# Patient Record
Sex: MALE | Race: BLACK OR AFRICAN AMERICAN | Employment: FULL TIME | URBAN - METROPOLITAN AREA
[De-identification: names, ages, dates, MRNs, and addresses within clinical notes are randomized per-mention and may not be internally consistent; named-entity substitution may affect disease eponyms.]

---

## 2017-03-24 ENCOUNTER — OFFICE VISIT (OUTPATIENT)
Dept: FAMILY MEDICINE | Facility: CLINIC | Age: 37
End: 2017-03-24
Payer: COMMERCIAL

## 2017-03-24 VITALS
HEART RATE: 61 BPM | TEMPERATURE: 97.6 F | WEIGHT: 176.4 LBS | OXYGEN SATURATION: 98 % | SYSTOLIC BLOOD PRESSURE: 124 MMHG | DIASTOLIC BLOOD PRESSURE: 76 MMHG | HEIGHT: 76 IN | BODY MASS INDEX: 21.48 KG/M2

## 2017-03-24 DIAGNOSIS — R07.0 THROAT PAIN: Primary | ICD-10-CM

## 2017-03-24 DIAGNOSIS — J02.0 ACUTE STREPTOCOCCAL PHARYNGITIS: ICD-10-CM

## 2017-03-24 LAB
DEPRECATED S PYO AG THROAT QL EIA: ABNORMAL
MICRO REPORT STATUS: ABNORMAL
SPECIMEN SOURCE: ABNORMAL

## 2017-03-24 PROCEDURE — 99213 OFFICE O/P EST LOW 20 MIN: CPT | Performed by: FAMILY MEDICINE

## 2017-03-24 PROCEDURE — 87880 STREP A ASSAY W/OPTIC: CPT | Performed by: FAMILY MEDICINE

## 2017-03-24 PROCEDURE — 96372 THER/PROPH/DIAG INJ SC/IM: CPT | Performed by: FAMILY MEDICINE

## 2017-03-24 NOTE — Clinical Note
Please review. Does the clinic need to charge anything else regarding the injection received in the office?

## 2017-03-24 NOTE — PATIENT INSTRUCTIONS
Pharyngitis: Strep (Confirmed)     You have had a positive test for strep throat. Strep throat is a contagious illness. It is spread by coughing, kissing or by touching others after touching your mouth or nose. Symptoms include throat pain which is worse with swallowing, aching all over, headache and fever. It is treated with antibiotic medication. This should help you start to feel better within 1-2 days.  Home care    Rest at home. Drink plenty of fluids to avoid dehydration.    No work or school for the first 2 days of taking the antibiotics. After this time, you will not be contagious. You can then return to school or work if you are feeling better.     The antibiotic medication must be taken for the full 10 days, even if you feel better. This is very important to ensure the infection is treated. It is also important to prevent drug-resistent organisms from developing. If you were given an antibiotic shot, no more antibiotics are needed.    You may use acetaminophen (Tylenol) or ibuprofen (Motrin, Advil) to control pain or fever, unless another medicine was prescribed for this. (NOTE: If you have chronic liver or kidney disease or ever had a stomach ulcer or GI bleeding, talk with your doctor before using these medicines.)    Throat lozenges or sprays (such as Chloraseptic) help reduce pain. Gargling with warm salt water will also reduce throat pain. Dissolve 1/2 teaspoon of salt in 1 glass of warm water. This may be useful just before meals.     Soft foods are okay. Avoid salty or spicy foods.  Follow-up care  Follow up with your healthcare provider or our staff if you are not improving over the next week.  When to seek medical advice  Call your healthcare provider right away if any of these occur:    Fever as directed by your doctor     New or worsening ear pain, sinus pain, or headache    Painful lumps in the back of neck    Stiff neck    Lymph nodes are getting larger or becoming soft in the  middle    Inability to swallow liquids, excessive drooling, or inability to open mouth wide due to throat pain    Signs of dehydration (very dark urine or no urine, sunken eyes, dizziness)    Trouble breathing or noisy breathing    Muffled voice    New rash    3345-8604 [employer]. [address] [place] [address]. All rights reserved. This information is not intended as a substitute for professional medical care. Always follow your healthcare professional's instructions.

## 2017-03-24 NOTE — MR AVS SNAPSHOT
After Visit Summary   3/24/2017    Obed Higginbotham    MRN: [id number]           Patient Information     Date Of Birth          1980        Visit Information        Provider Department      3/24/2017 10:00 AM Roxane Johnson MD [facility]        Today's Diagnoses     Throat pain    -  1    Acute streptococcal pharyngitis          Care Instructions      Pharyngitis: Strep (Confirmed)     You have had a positive test for strep throat. Strep throat is a contagious illness. It is spread by coughing, kissing or by touching others after touching your mouth or nose. Symptoms include throat pain which is worse with swallowing, aching all over, headache and fever. It is treated with antibiotic medication. This should help you start to feel better within 1-2 days.  Home care    Rest at home. Drink plenty of fluids to avoid dehydration.    No work or school for the first 2 days of taking the antibiotics. After this time, you will not be contagious. You can then return to school or work if you are feeling better.     The antibiotic medication must be taken for the full 10 days, even if you feel better. This is very important to ensure the infection is treated. It is also important to prevent drug-resistent organisms from developing. If you were given an antibiotic shot, no more antibiotics are needed.    You may use acetaminophen (Tylenol) or ibuprofen (Motrin, Advil) to control pain or fever, unless another medicine was prescribed for this. (NOTE: If you have chronic liver or kidney disease or ever had a stomach ulcer or GI bleeding, talk with your doctor before using these medicines.)    Throat lozenges or sprays (such as Chloraseptic) help reduce pain. Gargling with warm salt water will also reduce throat pain. Dissolve 1/2 teaspoon of salt in 1 glass of warm water. This may be useful just before meals.     Soft foods are okay. Avoid salty or spicy foods.  Follow-up care  Follow up with  "your healthcare provider or our staff if you are not improving over the next week.  When to seek medical advice  Call your healthcare provider right away if any of these occur:    Fever as directed by your doctor     New or worsening ear pain, sinus pain, or headache    Painful lumps in the back of neck    Stiff neck    Lymph nodes are getting larger or becoming soft in the middle    Inability to swallow liquids, excessive drooling, or inability to open mouth wide due to throat pain    Signs of dehydration (very dark urine or no urine, sunken eyes, dizziness)    Trouble breathing or noisy breathing    Muffled voice    New rash    9465-6320 [employer]. [address] [place] [address]. All rights reserved. This information is not intended as a substitute for professional medical care. Always follow your healthcare professional's instructions.              Follow-ups after your visit        Follow-up notes from your care team     Return if symptoms worsen or fail to improve.      Who to contact     Normal or non-critical lab and imaging results will be communicated to you by [employer], letter or phone within 4 business days after the clinic has received the results. If you do not hear from us within 7 days, please contact the clinic through [employer] or phone. If you have a critical or abnormal lab result, we will notify you by phone as soon as possible.  Submit refill requests through "[employer] or call your pharmacy and they will forward the refill request to us. Please allow 3 business days for your refill to be completed.          If you need to speak with a  for additional information , please call: [phone]             Additional Information About Your Visit        "[employer] Information     "[employer] lets you send messages to your doctor, view your test results, renew your prescriptions, schedule appointments and more. To sign up, go to [email or web address] . Click on \"Log " "in\" on the left side of the screen, which will take you to the Welcome page. Then click on \"Sign up Now\" on the right side of the page.     You will be asked to enter the access code listed below, as well as some personal information. Please follow the directions to create your username and password.     Your access code is: [email or web address]  Expires: 2017 10:39 AM     Your access code will  in 90 days. If you need help or a new code, please call your [facility] clinic or [phone].        Care EveryWhere ID     This is your Care EveryWhere ID. This could be used by other organizations to access your [facility] medical records  [id number]        Your Vitals Were     Pulse Temperature Height Pulse Oximetry BMI (Body Mass Index)       61 97.6  F (36.4  C) (Tympanic) 6' 4\" (1.93 m) 98% 21.47 kg/m2        Blood Pressure from Last 3 Encounters:   17 124/76   16 124/64   16 122/71    Weight from Last 3 Encounters:   17 176 lb 6.4 oz (80 kg)   16 176 lb 9.6 oz (80.1 kg)   16 177 lb (80.3 kg)              We Performed the Following     Strep, Rapid Screen          Today's Medication Changes          These changes are accurate as of: 3/24/17 10:39 AM.  If you have any questions, ask your nurse or doctor.               Start taking these medicines.        Dose/Directions    penicillin G benzathine [id number] UNIT/ML injection   Commonly known as:  BICILLIN   Used for:  Acute streptococcal pharyngitis        Dose:  2 mL   Inject 2 mLs (1.2 Million Units) into the muscle once for 1 dose   Quantity:  2 mL   Refills:  0            Where to get your medicines      Some of these will need a paper prescription and others can be bought over the counter.  Ask your nurse if you have questions.     You don't need a prescription for these medications     penicillin G benzathine [id number] UNIT/ML injection                Primary Care Provider Office Phone # Fax #    Valente Lorenz PA-C [phone] " [phone]       [facility] [address] [facility] [address]        Thank you!     Thank you for choosing [facility]  for your care. Our goal is always to provide you with excellent care. Hearing back from our patients is one way we can continue to improve our services. Please take a few minutes to complete the written survey that you may receive in the mail after your visit with us. Thank you!             Your Updated Medication List - Protect others around you: Learn how to safely use, store and throw away your medicines at www.disposemymeds.org.          This list is accurate as of: 3/24/17 10:39 AM.  Always use your most recent med list.                   Brand Name Dispense Instructions for use    ADVIL PO      Take 400 mg by mouth every 6 hours as needed for moderate pain       penicillin G benzathine [id number] UNIT/ML injection    BICILLIN    2 mL    Inject 2 mLs (1.2 Million Units) into the muscle once for 1 dose

## 2017-03-24 NOTE — NURSING NOTE
"Chief Complaint   Patient presents with     Pharyngitis       Initial /76  Pulse 61  Temp 97.6  F (36.4  C) (Tympanic)  Ht 6' 4\" (1.93 m)  Wt 176 lb 6.4 oz (80 kg)  SpO2 98%  BMI 21.47 kg/m2 Estimated body mass index is 21.47 kg/(m^2) as calculated from the following:    Height as of this encounter: 6' 4\" (1.93 m).    Weight as of this encounter: 176 lb 6.4 oz (80 kg).  Medication Reconciliation: complete     The following medication was given:     MEDICATION: Bicillin LA 1.2/ 2 mL  ROUTE: IM  SITE: LUQ - Gluteus  DOSE: 2mL   LOT #: [id number]  :  [employer]  EXPIRATION DATE:  12/2017  NDC#: 33467-244-45  Per Dr. Elizabeth Mar MA      "

## 2017-03-24 NOTE — PROGRESS NOTES
"  SUBJECTIVE:  Obed Higginbotham is a 37 year old male who presents with the following concerns;              Symptoms: cc Present Absent Comment   Fever/Chills   x    Fatigue  x     Muscle Aches  x     Eye Irritation   x    Sneezing   x    Nasal Amaury/Drg  x     Sinus Pressure/Pain  x     Loss of smell   x    Dental pain   x    Sore Throat  x     Swollen Glands  x  Tender    Ear Pain/Fullness   x    Cough   x    Wheeze   x    Chest Pain   x    Shortness of breath   x    Rash   x    Other   x      Symptom duration:  x1 day   Sympom severity:  worsening   Treatments tried:  none    Contacts:  wife was dx with strep        Medications updated and reviewed.  Current Outpatient Prescriptions   Medication     penicillin G benzathine (BICILLIN) [id number] UNIT/ML injection     Ibuprofen (ADVIL PO)     No current facility-administered medications for this visit.        Past, family and surgical history is updated and reviewed in the record.    ROS:  Other than noted above, general, HEENT, respiratory, cardiac and gastrointestinal systems are negative.    OBJECTIVE:  /76  Pulse 61  Temp 97.6  F (36.4  C) (Tympanic)  Ht 6' 4\" (1.93 m)  Wt 176 lb 6.4 oz (80 kg)  SpO2 98%  BMI 21.47 kg/m2  GENERAL:  Alert, no acute distress  EYES:  PERRL, EOM normal, conjunctiva and lids normal  HEENT:  Ears and TMs normal, oral mucosa normal. Erythematous posterior oropharynx with enlarged tonsils. No exudates noted.   RESP:  Lungs clear to auscultation.  CV: normal rate, regular rhythm, no murmur or gallop.    DATA  Reviewed and discussed with patient prior to discharge.  Results for orders placed or performed in visit on 03/24/17   Strep, Rapid Screen   Result Value Ref Range    Specimen Description Throat     Rapid Strep A Screen (A)      POSITIVE: Group A Streptococcal antigen detected by immunoassay.    Micro Report Status FINAL 03/24/2017        Assessment/Plan:     bOed was seen today for pharyngitis.    Diagnoses and all " orders for this visit:    Throat pain  -     Strep, Rapid Screen  Throat lozenges or sprays (such as Chloraseptic) help reduce pain. Gargling with warm salt water will also reduce throat pain. Dissolve 1/2 teaspoon of salt in 1 glass of warm water. This may be useful just before meals.    Acute streptococcal pharyngitis  Patient requested for an injection instead, states that he forgets to take pills.   -     penicillin G benzathine (BICILLIN) [id number] UNIT/ML injection; Inject 2 mLs (1.2 Million Units) into the muscle once for 1 dose           Follow up if symptoms fail to improve or worsen.      The patient was in agreement with the plan today and had no questions or concerns prior to leaving the clinic.      Roxane Johnson M.D    [facility]

## 2018-03-27 ENCOUNTER — OFFICE VISIT (OUTPATIENT)
Dept: UROLOGY | Facility: CLINIC | Age: 38
End: 2018-03-27
Payer: COMMERCIAL

## 2018-03-27 VITALS — SYSTOLIC BLOOD PRESSURE: 126 MMHG | DIASTOLIC BLOOD PRESSURE: 79 MMHG | OXYGEN SATURATION: 100 % | HEART RATE: 65 BPM

## 2018-03-27 DIAGNOSIS — Z30.09 VASECTOMY EVALUATION: Primary | ICD-10-CM

## 2018-03-27 PROCEDURE — 99203 OFFICE O/P NEW LOW 30 MIN: CPT | Performed by: UROLOGY

## 2018-03-27 NOTE — PROGRESS NOTES
Vasectomy Consult    Reason for visit:   Discuss as a method of birth control/sterilization.  He is interested in vasectomy scrotally.  Patient has 2 children and desires sterilization.  Does not want to use condom or having partners on birth control pills.  He has no erections problem.  He has no urinary complaints.    No current outpatient prescriptions on file.     No Known Allergies  Past Medical History:   Diagnosis Date     NO ACTIVE PROBLEMS      Stress fracture of left tibia 12/5/2012     Past Surgical History:   Procedure Laterality Date     LAPAROSCOPIC APPENDECTOMY N/A 12/18/2015    Procedure: LAPAROSCOPIC APPENDECTOMY;  Surgeon: Rodriguez Gill MD;  Location: [facility] OR     NO HISTORY OF SURGERY        No family history on file.  Social History     Social History     Marital status:      Spouse name: N/A     Number of children: N/A     Years of education: N/A     Social History Main Topics     Smoking status: Current Some Day Smoker     Packs/day: 0.25     Smokeless tobacco: Never Used     Alcohol use No     Drug use: No     Sexual activity: Yes     Partners: Female     Other Topics Concern     Parent/Sibling W/ Cabg, Mi Or Angioplasty Before 65f 55m? No     Social History Narrative       REVIEW OF SYSTEMS  =================  C: NEGATIVE for fever, chills, change in weight  I: NEGATIVE for worrisome rashes, moles or lesions  E/M: NEGATIVE for ear, mouth and throat problems  R: NEGATIVE for significant cough or SHORTNESS OF BREATH  CV:  NEGATIVE for chest pain, palpitations or peripheral edema  GI: NEGATIVE for nausea, abdominal pain, heartburn, or change in bowel habits  NEURO: NEGATIVE numbness/weakness  : see HPI  PSYCH: NEGATIVE depression/anxiety  LYmph: no new enlarged lymph nodes  Ortho: no new trauma/movements      Physical Exam:  /79 (BP Location: Right arm, Patient Position: Chair, Cuff Size: Adult Regular)  Pulse 65  SpO2 100%   Patient is pleasant, in no acute distress, good  general condition.  HEENT:  Normalcephalic, atraumatic  Lung: no evidence of respiratory distress    Abdomen: Soft, nondistended, non tender. No masses. No rebound or guarding.   Exam: penis no d/c testis no masses bilateral vas palpated no scrotal lesion  Skin: Warm and dry.  No redness.  Neuro: grossly normal  Psych normal mood and affect  Musculoskeletal  moving all extremities        Discussed    That vasectomy is permanent method of birth control.    That vasectomy can fail due to recanalization of the vas even many months/years later.    That he needs 2 negative sperm checks to be considered sterile    That there are other methods that are not permanent and also that the sperm can be frozen for later use.    How the technique is performed, risks of infection, bleeding, damage to the testes vessels and testes atrophy    Long term complication such as chronic and difficult to treat testes pain and questionable increase incidence of prostate cancer    That the procedure can be done at the clinic or hospital OR        Plan:    Stop Aspirin  Will schedule Vasectomy in the future

## 2018-03-27 NOTE — PATIENT INSTRUCTIONS
Your Vasectomy is scheduled 4/11/2018 at 9:45am in the [address]. Please call [phone] if you need to reschedule this appointment or if you have any question.     Preparation for Vasectomy:  Shave the hair away from the base of your penis and around your testicles.  Wear snug underwear the day of the vasectomy to support your testicles.  Do not take aspirin, ibuprofen, advil, motrin, aleve products one week prior to your vasectomy.  Arrange for a  if you take any medication for relaxation from the physician.      General Vasectomy Information    Vasectomy is a surgery.  If it is successful, it will be impossible for you to ever father children.  The following information regards the male sterilization done by an operation called a vasectomy.  This is done in the physician's office.    The operation done to sterilize the male is easier, safer and much less expensive than the operation done to sterilize the woman.    Sterilization should be considered permanent.  For most males, once the operation is done, it can never me undone.  There are attempts made occasionally to reconnect the tubes that have been cut during the procedure, but this is very difficult and expensive and works only about 50-70% of the time.  In order for any of the physicians in our clinic to do a vasectomy, we require that you consider this a permanent form a sterilization.    A vasectomy can be done at any time, but it is best to think about having it done when you can take at least one day off from work and any excess activities.    Your decision to have a vasectomy should only be made with the following facts clear in mind.    1. First, a vasectomy is only one of several means of birth control.  Many form of temporary contraception are available.  If you have any questions about other methods, please discuss this with your physician.    2. A vasectomy may be unsuccessful in approximately one  out of 1000 couples per year.  This occurs when the tubes which are cut during the procedure reconnect themselves.  Sterility cannot be guaranteed.    3. You should be aware that it is the current belief of the medical profession that a vasectomy procedure does not alter a male physically, physiologically or sexually.  Because each person is a unique individual, there is always the possibility of an adverse phychiatric reaction.  This can be best avoided by being very comfortable in your own mind that you want to have this done, and that you do not want to father any children in the future.  If this is not clear in your mind, this should be further discussed with your physician.    4. You will not notice a change in the volume of your ejaculate since sperm is a very small amount of the semen and it is only the sperm that is stopped from entering the ejaculate after a vasectomy.  Your prostate and seminal vesicle glands really supply most of the semen and this is not at all decreased after a vasectomy.  Also there is no effect on the male hormones.    5. You do not become sterile immediately following a vasectomy due to the fact that there is still sperm remaining in your system that must be eliminated by ejaculation.  For this reason, your sexual partner could still become pregnant for a period of time following the vasectomy operation.  It is necessary that contraceptive measures be used until you receive confirmation from your physician that you are sterile.  It takes approximately 12 ejaculations to clear the semen of sperm, but this can differ in different men.  For this reason, it is very important that your semen be checked for sperm before you are considered sterile, and this should be done approximately 12 weeks after your vasectomy.      6. Vasectomy has risks and benefits.  Among the risks are possible complications resulting from the procedure.  These risks include but are not limited to:   A.  Bleeding,  infection, or hematoma occuring during or in the recovery period   from the procedure.   B.  Sperm granuloma or a small pea to walnut sized lump which is a collection of   scar tissue and sperm in your scrotal sack and remains permanently   C.  There may be an increased risk of prostate cancer although the data is   unclear.

## 2018-03-27 NOTE — MR AVS SNAPSHOT
After Visit Summary   3/27/2018    Obed Higginbotham    MRN: [id number]           Patient Information     Date Of Birth          1980        Visit Information        Provider Department      3/27/2018 8:30 AM Javier Barahona MD [facility] Instructions    Your Vasectomy is scheduled 4/11/2018 at 9:45am in the [address]. Please call [phone] if you need to reschedule this appointment or if you have any question.     Preparation for Vasectomy:  Shave the hair away from the base of your penis and around your testicles.  Wear snug underwear the day of the vasectomy to support your testicles.  Do not take aspirin, ibuprofen, advil, motrin, aleve products one week prior to your vasectomy.  Arrange for a  if you take any medication for relaxation from the physician.      General Vasectomy Information    Vasectomy is a surgery.  If it is successful, it will be impossible for you to ever father children.  The following information regards the male sterilization done by an operation called a vasectomy.  This is done in the physician's office.    The operation done to sterilize the male is easier, safer and much less expensive than the operation done to sterilize the woman.    Sterilization should be considered permanent.  For most males, once the operation is done, it can never me undone.  There are attempts made occasionally to reconnect the tubes that have been cut during the procedure, but this is very difficult and expensive and works only about 50-70% of the time.  In order for any of the physicians in our clinic to do a vasectomy, we require that you consider this a permanent form a sterilization.    A vasectomy can be done at any time, but it is best to think about having it done when you can take at least one day off from work and any excess activities.    Your decision to have a vasectomy should only be made with the  following facts clear in mind.    1. First, a vasectomy is only one of several means of birth control.  Many form of temporary contraception are available.  If you have any questions about other methods, please discuss this with your physician.    2. A vasectomy may be unsuccessful in approximately one out of 1000 couples per year.  This occurs when the tubes which are cut during the procedure reconnect themselves.  Sterility cannot be guaranteed.    3. You should be aware that it is the current belief of the medical profession that a vasectomy procedure does not alter a male physically, physiologically or sexually.  Because each person is a unique individual, there is always the possibility of an adverse phychiatric reaction.  This can be best avoided by being very comfortable in your own mind that you want to have this done, and that you do not want to father any children in the future.  If this is not clear in your mind, this should be further discussed with your physician.    4. You will not notice a change in the volume of your ejaculate since sperm is a very small amount of the semen and it is only the sperm that is stopped from entering the ejaculate after a vasectomy.  Your prostate and seminal vesicle glands really supply most of the semen and this is not at all decreased after a vasectomy.  Also there is no effect on the male hormones.    5. You do not become sterile immediately following a vasectomy due to the fact that there is still sperm remaining in your system that must be eliminated by ejaculation.  For this reason, your sexual partner could still become pregnant for a period of time following the vasectomy operation.  It is necessary that contraceptive measures be used until you receive confirmation from your physician that you are sterile.  It takes approximately 12 ejaculations to clear the semen of sperm, but this can differ in different men.  For this reason, it is very important that your  "semen be checked for sperm before you are considered sterile, and this should be done approximately 12 weeks after your vasectomy.      6. Vasectomy has risks and benefits.  Among the risks are possible complications resulting from the procedure.  These risks include but are not limited to:   A.  Bleeding, infection, or hematoma occuring during or in the recovery period   from the procedure.   B.  Sperm granuloma or a small pea to walnut sized lump which is a collection of   scar tissue and sperm in your scrotal sack and remains permanently   C.  There may be an increased risk of prostate cancer although the data is   unclear.            Follow-ups after your visit        Your next 10 appointments already scheduled     Apr 11, 2018 10:00 AM CDT   [facility] with J[facility]   [facility]    [address] [place] [id number] [phone]              Who to contact     If you have questions or need follow up information about today's clinic visit or your schedule please contact [facility] directly at [phone].  Normal or non-critical lab and imaging results will be communicated to you by MyChart, letter or phone within 4 business days after the clinic has received the results. If you do not hear from us within 7 days, please contact the clinic through [employer] or phone. If you have a critical or abnormal lab result, we will notify you by phone as soon as possible.  Submit refill requests through [employer] or call your pharmacy and they will forward the refill request to us. Please allow 3 business days for your refill to be completed.          Additional Information About Your Visit        [employer] Information     [employer] lets you send messages to your doctor, view your test results, renew your prescriptions, schedule appointments and more. To sign up, go to [email or web address] . Click on \"Log in\" on the left side of the screen, which " "will take you to the Welcome page. Then click on \"Sign up Now\" on the right side of the page.     You will be asked to enter the access code listed below, as well as some personal information. Please follow the directions to create your username and password.     Your access code is: [email or web address]  Expires: 2018  8:48 AM     Your access code will  in 90 days. If you need help or a new code, please call your [place] clinic or [phone].        Care EveryWhere ID     This is your Care EveryWhere ID. This could be used by other organizations to access your [place] medical records  [id number]        Your Vitals Were     Pulse Pulse Oximetry                65 100%           Blood Pressure from Last 3 Encounters:   18 126/79   17 124/76   16 124/64    Weight from Last 3 Encounters:   17 80 kg (176 lb 6.4 oz)   16 80.1 kg (176 lb 9.6 oz)   16 80.3 kg (177 lb)              Today, you had the following     No orders found for display         Today's Medication Changes          These changes are accurate as of 3/27/18  8:48 AM.  If you have any questions, ask your nurse or doctor.               Stop taking these medicines if you haven't already. Please contact your care team if you have questions.     ADVIL PO   Stopped by:  Javier Barahona MD                    Primary Care Provider Office Phone # Fax #    Valente Lorenz PA-C [phone]       [address] [place] [id number]        Equal Access to Services     [facility]: Hadii meredith enriquez hadasho Solyla, waaxda luqadaha, qaybta kaalmajoel pedro, carline dixon . So [facility] [phone].    ATENCIÓN: Si habla español, tiene a burks disposición servicios gratuitos de asistencia lingüística. Llame al [phone].    We comply with applicable federal civil rights laws and [place] laws. We do not discriminate on the basis of race, color, national origin, age, " disability, sex, sexual orientation, or gender identity.            Thank you!     Thank you for choosing [facility]  for your care. Our goal is always to provide you with excellent care. Hearing back from our patients is one way we can continue to improve our services. Please take a few minutes to complete the written survey that you may receive in the mail after your visit with us. Thank you!             Your Updated Medication List - Protect others around you: Learn how to safely use, store and throw away your medicines at www.disposemymeds.org.      Notice  As of 3/27/2018  8:48 AM    You have not been prescribed any medications.

## 2018-05-29 ENCOUNTER — OFFICE VISIT (OUTPATIENT)
Dept: UROLOGY | Facility: CLINIC | Age: 38
End: 2018-05-29
Payer: COMMERCIAL

## 2018-05-29 DIAGNOSIS — Z30.2 ENCOUNTER FOR STERILIZATION: Primary | ICD-10-CM

## 2018-05-29 PROCEDURE — 55250 REMOVAL OF SPERM DUCT(S): CPT | Performed by: UROLOGY

## 2018-05-29 PROCEDURE — 88302 TISSUE EXAM BY PATHOLOGIST: CPT | Performed by: UROLOGY

## 2018-05-29 NOTE — MR AVS SNAPSHOT
After Visit Summary   5/29/2018    Obed Higginbotham    MRN: [id number]           Patient Information     Date Of Birth          1980        Visit Information        Provider Department      5/29/2018 10:30 AM Javier Barahona MD; [facility]        Today's Diagnoses     Encounter for sterilization    -  1       Follow-ups after your visit        Future tests that were ordered for you today     Open Standing Orders        Priority Remaining Interval Expires Ordered    Semen Post Vasectomy Qual ([place], [place], [place]) Routine 2/2  5/29/2019 5/29/2018            Who to contact     If you have questions or need follow up information about today's clinic visit or your schedule please contact [facility] directly at [phone].  Normal or non-critical lab and imaging results will be communicated to you by MyChart, letter or phone within 4 business days after the clinic has received the results. If you do not hear from us within 7 days, please contact the clinic through MyChart or phone. If you have a critical or abnormal lab result, we will notify you by phone as soon as possible.  Submit refill requests through [employer] or call your pharmacy and they will forward the refill request to us. Please allow 3 business days for your refill to be completed.          Additional Information About Your Visit        Care EveryWhere ID     This is your Care EveryWhere ID. This could be used by other organizations to access your [facility] medical records  [id number]         Blood Pressure from Last 3 Encounters:   05/29/18 (P) 121/83   03/27/18 126/79   03/24/17 124/76    Weight from Last 3 Encounters:   03/24/17 80 kg (176 lb 6.4 oz)   11/12/16 80.1 kg (176 lb 9.6 oz)   08/01/16 80.3 kg (177 lb)              We Performed the Following     Surgical pathology exam     VASECTOMY UNILAT/BILAT W POSTOP SEMEN        Primary Care Provider Office Phone # Fax #    Valente Barragan  SUGEY Lorenz [phone]       [address] [facility] [address]        Equal Access to Services     ROSA RODRIGUEZ : Lynn Coyne, cristian chin, angelajuvenal kaaveryda hoodharleenjoel, carline kellyin hayaaalphonso andradevirgen quintana [facility]. So [facility] [phone].    ATENCIÓN: Si habla español, tiene a burks disposición servicios gratuitos de asistencia lingüística. Llame al [phone].    We comply with applicable federal civil rights laws and [place] laws. We do not discriminate on the basis of race, color, national origin, age, disability, sex, sexual orientation, or gender identity.            Thank you!     Thank you for choosing [facility]  for your care. Our goal is always to provide you with excellent care. Hearing back from our patients is one way we can continue to improve our services. Please take a few minutes to complete the written survey that you may receive in the mail after your visit with us. Thank you!             Your Updated Medication List - Protect others around you: Learn how to safely use, store and throw away your medicines at www.disposemymeds.org.      Notice  As of 5/29/2018 11:13 AM    You have not been prescribed any medications.

## 2018-05-30 LAB — COPATH REPORT: NORMAL

## 2019-04-26 ENCOUNTER — OFFICE VISIT (OUTPATIENT)
Dept: FAMILY MEDICINE | Facility: CLINIC | Age: 39
End: 2019-04-26
Payer: COMMERCIAL

## 2019-04-26 VITALS
DIASTOLIC BLOOD PRESSURE: 87 MMHG | BODY MASS INDEX: 22.53 KG/M2 | SYSTOLIC BLOOD PRESSURE: 137 MMHG | HEART RATE: 79 BPM | WEIGHT: 185 LBS | HEIGHT: 76 IN

## 2019-04-26 DIAGNOSIS — Z00.00 ROUTINE HISTORY AND PHYSICAL EXAMINATION OF ADULT: Primary | ICD-10-CM

## 2019-04-26 DIAGNOSIS — R19.7 DIARRHEA, UNSPECIFIED TYPE: ICD-10-CM

## 2019-04-26 DIAGNOSIS — A09 TRAVELER'S DIARRHEA: ICD-10-CM

## 2019-04-26 DIAGNOSIS — A05.3: ICD-10-CM

## 2019-04-26 LAB
BASOPHILS # BLD AUTO: 0 10E9/L (ref 0–0.2)
BASOPHILS NFR BLD AUTO: 0.2 %
CHOLEST SERPL-MCNC: 168 MG/DL
DIFFERENTIAL METHOD BLD: NORMAL
EOSINOPHIL # BLD AUTO: 0.1 10E9/L (ref 0–0.7)
EOSINOPHIL NFR BLD AUTO: 2.4 %
ERYTHROCYTE [DISTWIDTH] IN BLOOD BY AUTOMATED COUNT: 13.1 % (ref 10–15)
HCT VFR BLD AUTO: 45.7 % (ref 40–53)
HDLC SERPL-MCNC: 47 MG/DL
HGB BLD-MCNC: 15.8 G/DL (ref 13.3–17.7)
LDLC SERPL CALC-MCNC: 98 MG/DL
LYMPHOCYTES # BLD AUTO: 1.7 10E9/L (ref 0.8–5.3)
LYMPHOCYTES NFR BLD AUTO: 30.2 %
MCH RBC QN AUTO: 32.3 PG (ref 26.5–33)
MCHC RBC AUTO-ENTMCNC: 34.6 G/DL (ref 31.5–36.5)
MCV RBC AUTO: 94 FL (ref 78–100)
MONOCYTES # BLD AUTO: 0.6 10E9/L (ref 0–1.3)
MONOCYTES NFR BLD AUTO: 10.6 %
NEUTROPHILS # BLD AUTO: 3.1 10E9/L (ref 1.6–8.3)
NEUTROPHILS NFR BLD AUTO: 56.6 %
NONHDLC SERPL-MCNC: 121 MG/DL
PLATELET # BLD AUTO: 157 10E9/L (ref 150–450)
RBC # BLD AUTO: 4.89 10E12/L (ref 4.4–5.9)
TRIGL SERPL-MCNC: 116 MG/DL
WBC # BLD AUTO: 5.5 10E9/L (ref 4–11)

## 2019-04-26 PROCEDURE — 99214 OFFICE O/P EST MOD 30 MIN: CPT | Mod: 25 | Performed by: NURSE PRACTITIONER

## 2019-04-26 PROCEDURE — 87329 GIARDIA AG IA: CPT | Mod: 59 | Performed by: NURSE PRACTITIONER

## 2019-04-26 PROCEDURE — 85025 COMPLETE CBC W/AUTO DIFF WBC: CPT | Performed by: NURSE PRACTITIONER

## 2019-04-26 PROCEDURE — 87506 IADNA-DNA/RNA PROBE TQ 6-11: CPT | Performed by: NURSE PRACTITIONER

## 2019-04-26 PROCEDURE — 87177 OVA AND PARASITES SMEARS: CPT | Performed by: NURSE PRACTITIONER

## 2019-04-26 PROCEDURE — 82306 VITAMIN D 25 HYDROXY: CPT | Performed by: NURSE PRACTITIONER

## 2019-04-26 PROCEDURE — 99396 PREV VISIT EST AGE 40-64: CPT | Performed by: NURSE PRACTITIONER

## 2019-04-26 PROCEDURE — 80061 LIPID PANEL: CPT | Performed by: NURSE PRACTITIONER

## 2019-04-26 PROCEDURE — 36415 COLL VENOUS BLD VENIPUNCTURE: CPT | Performed by: NURSE PRACTITIONER

## 2019-04-26 PROCEDURE — 87209 SMEAR COMPLEX STAIN: CPT | Performed by: NURSE PRACTITIONER

## 2019-04-26 ASSESSMENT — ENCOUNTER SYMPTOMS
PALPITATIONS: 0
MYALGIAS: 0
EYE PAIN: 0
DIARRHEA: 1
FREQUENCY: 0
HEADACHES: 0
CONSTIPATION: 0
ABDOMINAL PAIN: 0
DYSURIA: 0
FEVER: 0
CHILLS: 0
HEARTBURN: 0
SHORTNESS OF BREATH: 0
JOINT SWELLING: 0
PARESTHESIAS: 0
ARTHRALGIAS: 0
DIZZINESS: 0
COUGH: 0
HEMATOCHEZIA: 0
HEMATURIA: 0
NERVOUS/ANXIOUS: 0
NAUSEA: 1
SORE THROAT: 0
WEAKNESS: 0

## 2019-04-26 ASSESSMENT — MIFFLIN-ST. JEOR: SCORE: 1846.68

## 2019-04-26 NOTE — PROGRESS NOTES
SUBJECTIVE:   CC: Obed Higginbotham is an 39 year old male who presents for preventative health visit.     Healthy Habits:     Getting at least 3 servings of Calcium per day:  Yes    Bi-annual eye exam:  NO    Dental care twice a year:  Yes    Sleep apnea or symptoms of sleep apnea:  None    Diet:  Regular (no restrictions)    Frequency of exercise:  6-7 days/week    Duration of exercise:  Greater than 60 minutes    Taking medications regularly:  Yes    Medication side effects:  None    PHQ-2 Total Score: 0    Additional concerns today:  No    Diarrhea  Onset: Monday 04/22    Description:   Consistency of stool: watery, turning into black watery now. Stomach rumbles. When he eats he has to use the bathroom immediately   Blood in stool: no   Number of loose stools in past 24 hours: he lost count, goes every 20 min    Progression of Symptoms:  same    Accompanying Signs & Symptoms:  Fever: no   Nausea or vomiting; YES, little nausea Monday/ Tuesday but is gone  Abdominal pain: no   Episodes of constipation: no   Weight loss: not sure    History:   Ill contacts: no   Recent use of antibiotics: no    Recent travels: YES- was in [facility] for 1.5 months. Came back Wednesday 04/24        Recent medication-new or changes(Rx or OTC): no     Precipitating factors:       Alleviating factors:       Therapies Tried and outcome:  lomotil;pepto Outcome: nothing helping  Denies fever, abdominal pain/cramping, blood in stool.       Today's PHQ-2 Score:   PHQ-2 ( 1999 Pfizer) 4/26/2019   Q1: Little interest or pleasure in doing things 0   Q2: Feeling down, depressed or hopeless 0   PHQ-2 Score 0   Q1: Little interest or pleasure in doing things Not at all   Q2: Feeling down, depressed or hopeless Not at all   PHQ-2 Score 0       Abuse: Current or Past(Physical, Sexual or Emotional)- No  Do you feel safe in your environment? Yes    Social History     Tobacco Use     Smoking status: Current Some Day Smoker     Packs/day: 0.25      "Smokeless tobacco: Never Used   Substance Use Topics     Alcohol use: No     If you drink alcohol do you typically have >3 drinks per day or >7 drinks per week? Not applicable    Alcohol Use 4/26/2019   Prescreen: >3 drinks/day or >7 drinks/week? No   Prescreen: >3 drinks/day or >7 drinks/week? -   No flowsheet data found.    Last PSA: No results found for: PSA    Reviewed orders with patient. Reviewed health maintenance and updated orders accordingly - Yes  Labs reviewed in EPIC    Reviewed and updated as needed this visit by clinical staff  Allergies  Meds         Reviewed and updated as needed this visit by Provider            Review of Systems   Constitutional: Negative for chills and fever.   HENT: Negative for congestion, ear pain, hearing loss and sore throat.    Eyes: Negative for pain and visual disturbance.   Respiratory: Negative for cough and shortness of breath.    Cardiovascular: Negative for chest pain, palpitations and peripheral edema.   Gastrointestinal: Positive for diarrhea and nausea. Negative for abdominal pain, constipation, heartburn and hematochezia.   Genitourinary: Negative for discharge, dysuria, frequency, genital sores, hematuria, impotence and urgency.   Musculoskeletal: Negative for arthralgias, joint swelling and myalgias.   Skin: Negative for rash.   Neurological: Negative for dizziness, weakness, headaches and paresthesias.   Psychiatric/Behavioral: Negative for mood changes. The patient is not nervous/anxious.        OBJECTIVE:   /87   Pulse 79   Ht 1.924 m (6' 3.75\")   Wt 83.9 kg (185 lb)   BMI 22.67 kg/m      Physical Exam  GENERAL: healthy, alert and no distress  EYES: Eyes grossly normal to inspection, PERRL and conjunctivae and sclerae normal  HENT: ear canals and TM's normal, nose and mouth without ulcers or lesions  NECK: no adenopathy, no asymmetry, masses, or scars and thyroid normal to palpation  RESP: lungs clear to auscultation - no rales, rhonchi or " wheezes  CV: regular rate and rhythm, normal S1 S2, no S3 or S4, no murmur, click or rub, no peripheral edema and peripheral pulses strong  ABDOMEN: soft, nontender, no hepatosplenomegaly, no masses and bowel sounds normal  MS: no gross musculoskeletal defects noted, no edema  SKIN: no suspicious lesions or rashes  NEURO: Normal strength and tone, mentation intact and speech normal  PSYCH: mentation appears normal, affect normal/bright    Diagnostic Test Results:  Results for orders placed or performed in visit on 04/26/19 (from the past 24 hour(s))   CBC with platelets and differential   Result Value Ref Range    WBC 5.5 4.0 - 11.0 10e9/L    RBC Count 4.89 4.4 - 5.9 10e12/L    Hemoglobin 15.8 13.3 - 17.7 g/dL    Hematocrit 45.7 40.0 - 53.0 %    MCV 94 78 - 100 fl    MCH 32.3 26.5 - 33.0 pg    MCHC 34.6 31.5 - 36.5 g/dL    RDW 13.1 10.0 - 15.0 %    Platelet Count 157 150 - 450 10e9/L    % Neutrophils 56.6 %    % Lymphocytes 30.2 %    % Monocytes 10.6 %    % Eosinophils 2.4 %    % Basophils 0.2 %    Absolute Neutrophil 3.1 1.6 - 8.3 10e9/L    Absolute Lymphocytes 1.7 0.8 - 5.3 10e9/L    Absolute Monocytes 0.6 0.0 - 1.3 10e9/L    Absolute Eosinophils 0.1 0.0 - 0.7 10e9/L    Absolute Basophils 0.0 0.0 - 0.2 10e9/L    Diff Method Automated Method        ASSESSMENT/PLAN:   1. Routine history and physical examination of adult  Preventive visit with acute problem addressed   - Lipid panel reflex to direct LDL Fasting  - CBC with platelets and differential  - Vitamin D Deficiency    2. Traveler's diarrhea  Recently returned from [facility]. 4-5 days of watery stools upwards to 20 daily in the absence of blood, fever, abd pains/cramps. Need to r/o parasitic, infectious, viral cause.   Partial results returned and pt tested positive to Vibrio infection. Called pt to advise of seriousness of infection and left VM. Tetracycline 500 mg BID x 7 days prescribed. HE is to follow up in clinic once antibiotic completion. For any abdominal  "pain, fever he is to present to the ED immediately.   - Ova and Parasite Exam Routine; Future  - Enteric Bacteria and Virus Panel by DIONNE Stool; Future  - Giardia antigen; Future  - Giardia antigen  - Enteric Bacteria and Virus Panel by DIONNE Stool  - Ova and Parasite Exam Routine    3. Diarrhea, unspecified type        COUNSELING:   Reviewed preventive health counseling, as reflected in patient instructions       Regular exercise       Healthy diet/nutrition    BP Readings from Last 1 Encounters:   04/26/19 137/87     Estimated body mass index is 22.67 kg/m  as calculated from the following:    Height as of this encounter: 1.924 m (6' 3.75\").    Weight as of this encounter: 83.9 kg (185 lb).           reports that he has been smoking.  He has been smoking about 0.25 packs per day. He has never used smokeless tobacco.      Counseling Resources:  ATP IV Guidelines  Pooled Cohorts Equation Calculator  FRAX Risk Assessment  ICSI Preventive Guidelines  Dietary Guidelines for Americans, 2010  USDA's MyPlate  ASA Prophylaxis  Lung CA Screening    HOSSEIN [facility]  "

## 2019-04-27 LAB
C COLI+JEJUNI+LARI FUSA STL QL NAA+PROBE: NOT DETECTED
EC STX1 GENE STL QL NAA+PROBE: NOT DETECTED
EC STX2 GENE STL QL NAA+PROBE: NOT DETECTED
ENTERIC PATHOGEN COMMENT: ABNORMAL
NOROV GI+II ORF1-ORF2 JNC STL QL NAA+PR: NOT DETECTED
RVA NSP5 STL QL NAA+PROBE: NOT DETECTED
SALMONELLA SP RPOD STL QL NAA+PROBE: NOT DETECTED
SHIGELLA SP+EIEC IPAH STL QL NAA+PROBE: NOT DETECTED
V CHOL+PARA RFBL+TRKH+TNAA STL QL NAA+PR: ABNORMAL
Y ENTERO RECN STL QL NAA+PROBE: NOT DETECTED

## 2019-04-28 ENCOUNTER — TELEPHONE (OUTPATIENT)
Dept: FAMILY MEDICINE | Facility: CLINIC | Age: 39
End: 2019-04-28

## 2019-04-28 DIAGNOSIS — A05.3: Primary | ICD-10-CM

## 2019-04-28 RX ORDER — TETRACYCLINE HYDROCHLORIDE 250 MG/1
500 CAPSULE ORAL 2 TIMES DAILY
Qty: 28 CAPSULE | Refills: 0 | Status: SHIPPED | OUTPATIENT
Start: 2019-04-28 | End: 2019-04-29

## 2019-04-28 NOTE — TELEPHONE ENCOUNTER
Spoke with pt and relayed positive Vibrio result. Started pt on Tetracycline 500 mg BID x 7 days.

## 2019-04-29 ENCOUNTER — TELEPHONE (OUTPATIENT)
Dept: FAMILY MEDICINE | Facility: CLINIC | Age: 39
End: 2019-04-29

## 2019-04-29 LAB
DEPRECATED CALCIDIOL+CALCIFEROL SERPL-MC: 27 UG/L (ref 20–75)
G LAMBLIA AG STL QL IA: NORMAL
O+P STL MICRO: NORMAL
SPECIMEN SOURCE: NORMAL
SPECIMEN SOURCE: NORMAL

## 2019-04-29 RX ORDER — TETRACYCLINE HYDROCHLORIDE 250 MG/1
500 CAPSULE ORAL 2 TIMES DAILY
Qty: 28 CAPSULE | Refills: 0 | Status: SHIPPED | OUTPATIENT
Start: 2019-04-29 | End: 2019-10-21

## 2019-04-29 NOTE — TELEPHONE ENCOUNTER
Discussed with Maddy at [place] [facility]. Discussed patient was treated with Tetracycline today. No hospitalization indicated at this time.     tetracycline (ACHROMYCIN/SUMYCIN) 250 MG capsule 28 capsule 0 4/29/2019  No   Sig - Route: Take 2 capsules (500 mg) by mouth 2 times daily - Oral   Sent to pharmacy as: tetracycline (ACHROMYCIN/SUMYCIN) 250 MG capsule   Class: E-Prescribe   Order: [id number]       No further information was needed.    Hollie Donaldson RN

## 2019-04-29 NOTE — TELEPHONE ENCOUNTER
Patient returns call to RN line, states he did receive VM from provider. He had a couple of questions, which were answered. He requested script location to be changed to [facility], so I reordered and sent as requested. No further needs at this time.    Kelly Chau, RN

## 2019-04-29 NOTE — TELEPHONE ENCOUNTER
Reason for Call:  Other call back    Detailed comments: [place] [facility] needs a callback regarding patients recent Vibrio infection on 4.26.19. Any indications that hospitalization was requested, if so admit and discharge dates. If there was antibiotics given; if so when, dose and duration.   Phone Number Patient can be reached at: Other phone number:  [phone]*    Best Time: any    Can we leave a detailed message on this number? YES - secure line    Call taken on 4/29/2019 at 2:56 PM by Rebecca Rock

## 2019-05-02 ENCOUNTER — TELEPHONE (OUTPATIENT)
Dept: FAMILY MEDICINE | Facility: CLINIC | Age: 39
End: 2019-05-02

## 2019-05-02 NOTE — TELEPHONE ENCOUNTER
Routing to Sosa Jolly:  Called patient and he did not want to come in for an appointment.  He said he was doing really well and did not see a need to come in.    Shira Fritz

## 2019-05-02 NOTE — TELEPHONE ENCOUNTER
Shira, were you able to get a hold of this pt to be seen by me tomorrow? It's very important he has serious infection. Thanks!

## 2019-07-10 ENCOUNTER — DOCUMENTATION ONLY (OUTPATIENT)
Dept: UROLOGY | Facility: CLINIC | Age: 39
End: 2019-07-10

## 2019-07-10 NOTE — PROGRESS NOTES
This patient has overdue labs. A letter was sent on 6/5/2019 and there has been no lab appointment made. If you still want these labs done, please have your care team contact the patient to make a lab appointment. Otherwise, please have the labs discontinued and close the encounter.    Thank you,  [facility]

## 2019-07-10 NOTE — LETTER
[address]  [place] [address]  [phone]          July 12, 2019    Obed Higginbotham                                                                                                                     [address] [facility] [address]            Dear Obed,    Please see enclosed directions in how to complete and scheduled your post-vasectomy semen analysis.       Please feel free to reach out to me with questions or concerns.         Lisa Zhao RN for Dr. Javier Barahona

## 2019-10-21 ENCOUNTER — OFFICE VISIT (OUTPATIENT)
Dept: FAMILY MEDICINE | Facility: CLINIC | Age: 39
End: 2019-10-21
Payer: COMMERCIAL

## 2019-10-21 VITALS
DIASTOLIC BLOOD PRESSURE: 81 MMHG | RESPIRATION RATE: 20 BRPM | SYSTOLIC BLOOD PRESSURE: 119 MMHG | OXYGEN SATURATION: 95 % | TEMPERATURE: 97.5 F | WEIGHT: 190 LBS | HEART RATE: 64 BPM | BODY MASS INDEX: 23.28 KG/M2

## 2019-10-21 DIAGNOSIS — J02.0 STREPTOCOCCAL PHARYNGITIS: ICD-10-CM

## 2019-10-21 DIAGNOSIS — R52 BODY ACHES: ICD-10-CM

## 2019-10-21 DIAGNOSIS — R07.0 THROAT PAIN: Primary | ICD-10-CM

## 2019-10-21 LAB
DEPRECATED S PYO AG THROAT QL EIA: ABNORMAL
FLUAV+FLUBV AG SPEC QL: NEGATIVE
FLUAV+FLUBV AG SPEC QL: NEGATIVE
SPECIMEN SOURCE: ABNORMAL
SPECIMEN SOURCE: NORMAL

## 2019-10-21 PROCEDURE — 99213 OFFICE O/P EST LOW 20 MIN: CPT | Mod: 25 | Performed by: PHYSICIAN ASSISTANT

## 2019-10-21 PROCEDURE — 87880 STREP A ASSAY W/OPTIC: CPT | Performed by: PHYSICIAN ASSISTANT

## 2019-10-21 PROCEDURE — 87804 INFLUENZA ASSAY W/OPTIC: CPT | Performed by: PHYSICIAN ASSISTANT

## 2019-10-21 PROCEDURE — 96372 THER/PROPH/DIAG INJ SC/IM: CPT | Performed by: PHYSICIAN ASSISTANT

## 2019-10-21 RX ORDER — PENICILLIN V POTASSIUM 500 MG/1
500 TABLET, FILM COATED ORAL 2 TIMES DAILY
Qty: 20 TABLET | Refills: 0 | Status: SHIPPED | OUTPATIENT
Start: 2019-10-21 | End: 2019-10-21

## 2019-10-21 NOTE — NURSING NOTE
Clinic Administered Medication Documentation    MEDICATION LIST:   Injectable Medication Documentation    Patient was given Penicillin G Benzathine (Bicillin). Prior to medication administration, verified patients identity using patient s name and date of birth. Please see MAR and medication order for additional information. Patient instructed to remain in clinic for 15 minutes.       Was entire vial of medication used? Yes  Vial/Syringe: Single dose vial  Expiration Date:  02/2020  Was this medication supplied by the patient? No     Lianet Araya, [facility]

## 2019-10-21 NOTE — PROGRESS NOTES
SUBJECTIVE:  Obed Higginbotham is a 39 year old male who presents with the following concerns;              Symptoms: cc Present Absent Comment   Fever/Chills  x  Felt feverish   Fatigue  x     Muscle Aches  x     Eye Irritation  x     Sneezing   x    Nasal Amaury/Drg  x     Sinus Pressure/Pain  x     Loss of smell  x     Dental pain   x    Sore Throat  x     Swollen Glands  x     Ear Pain/Fullness  x  Popping yesterday   Cough  x     Wheeze   x    Chest Pain   x    Shortness of breath   x    Rash   x    Other         Symptom duration:  Since Saturday morning   Sympom severity:  mild   Treatments tried:  aleve-last does 8am today   Contacts:  none       Medications updated and reviewed.  Past, family and surgical history is updated and reviewed in the record.    ROS:  Other than noted above, general, HEENT, respiratory, cardiac and gastrointestinal systems are negative.    OBJECTIVE:  /81   Pulse 64   Temp 97.5  F (36.4  C) (Tympanic)   Resp 20   Wt 86.2 kg (190 lb)   SpO2 95%   BMI 23.28 kg/m    GENERAL: Pleasant and interactive. No acute distress.  HEENT: Mild injection of conjunctiva. TMs clear. Oropharynx moist and with mild erythema  NECK: mild-mod anterior cervical LAD  CHEST:  clear, no wheezing or rales. Normal symmetric air entry throughout both lung fields. No chest wall deformities or tenderness.  HEART:  S1 and S2 normal, no murmurs, clicks, gallops or rubs. Regular rate and rhythm.  SKIN:  Only benign skin findings. No unusual rashes or suspicious skin lesions noted. Nails appear normal.    RST: pos  Influenza: neg      Assessment:    Encounter Diagnoses   Name Primary?     Throat pain Yes     Body aches      Streptococcal pharyngitis      Plan:   Orders Placed This Encounter     DISCONTD: penicillin V (VEETID) 500 MG tablet     penicillin G benzathine (BICILLIN L-A) 530677 UNIT/ML injection       Patient requested Bicillin - pen VK prescription cancelled.   Supportive therapy also  discussed. Follow up if symptoms fail to improve or worsen.      The patient was in agreement with the plan today and had no questions or concerns prior to leaving the clinic.     Jina Eric PA-C

## 2019-12-04 ENCOUNTER — NURSE TRIAGE (OUTPATIENT)
Dept: FAMILY MEDICINE | Facility: CLINIC | Age: 39
End: 2019-12-04

## 2019-12-04 NOTE — TELEPHONE ENCOUNTER
Spoke with patient. Reports that he took Vitamin B3 and Arginine supplements about an hour for the first time and developed a hot, burning sensation on his forearms, back and back of his thighs to his calves. States he had to take off his clothes because he felt like he was burning up.  Advised patient he can take benadryl to try and help with symptoms or go into UC to be evaluated.  Patient denies any SOB, CP, wheezing, hoarseness, facial/throat/tongue swelling abdominal pain/cramping, diarrhea, N/V, dizziness/lightheadedness, hives, redness, or other negative symptoms.  Advised patient not to take supplements again until he consults with a doctor. Advised patient to call back if symptoms worsen or change. Patient agreed.     Additional Information    Negative: Life-threatening reaction in the past to similar substance (e.g., food, insect bite/sting, medication, etc.) and < 2 hours since exposure    Negative: Wheezing, stridor, hoarseness, or difficulty breathing    Negative: Tightness in the chest or throat and begins within 2 hours of exposure to allergic substance    Negative: Difficulty swallowing, drooling, or slurred speech    Negative: Difficult to awaken or acting confused (e.g., disoriented, slurred speech)    Negative: Unresponsive, passed out, or very weak    Negative: Other symptom of severe allergic reaction (Exception: Hives or facial swelling alone)    Negative: Sounds like a life-threatening emergency to the triager    Negative: Widespread hives and onset > 2 hours after exposure to high-risk allergen (e.g., sting, nuts, 1st dose of antibiotic)    Negative: Widespread itching and onset > 2 hours after exposure to high-risk allergen (e.g., sting, nuts, 1st dose of antibiotic)    Negative: Face swelling and onset > 2 hours after exposure to high-risk allergen (e.g., sting, nuts, 1st dose of antibiotic)    Negative: Gave epinephrine shot and no symptoms now    Negative: Gave asthma inhaler or neb and  no symptoms now    Negative: Widespread hives, itching or facial swelling and onset < 2 hours of exposure to high-risk allergen (e.g., sting, nuts, 1st dose of antibiotic)    Negative: Vomiting or abdominal cramps and onset < 2 hours of exposure to high-risk allergen (e.g., sting, nuts, 1st dose of antibiotic)    Negative: Patient wants to be seen    Negative: Took antihistamine (e.g., Benadryl) by mouth and no symptoms now    Protocols used: [id number]    Cheyenne Bryan RN

## 2019-12-04 NOTE — TELEPHONE ENCOUNTER
Reason for call:  Patient reporting a symptom    Symptom or request: allergic reaction to vitamins B 3    And Al-Arginine    Burning on neck, arms, legs    Duration (how long have symptoms been present): 1 hour    Have you been treated for this before? n/a    Additional comments: patient holding, red flag    Phone Number patient can be reached at:  Cell number on file:    Telephone Information:   Mobile [phone]       Best Time:  asap    Can we leave a detailed message on this number:  YES    Call taken on 12/4/2019 at 5:14 PM by Shruthi Irizarry

## 2019-12-26 ENCOUNTER — TELEPHONE (OUTPATIENT)
Dept: FAMILY MEDICINE | Facility: CLINIC | Age: 39
End: 2019-12-26

## 2019-12-26 NOTE — TELEPHONE ENCOUNTER
Reason for call:  Patient reporting a symptom    Symptom or request: Erectile dysfunction    Duration (how long have symptoms been present): Unknown    Have you been treated for this before? Unsure    Additional comments: Pt would like to speak with a nurse.    Phone Number patient can be reached at:  Home number on file [phone] (home)    Best Time:  Anytime    Can we leave a detailed message on this number:  No    Call taken on 12/26/2019 at 10:20 AM by Ev Garrett

## 2019-12-26 NOTE — TELEPHONE ENCOUNTER
Patient was called and wanted to know if he should be seen with urology first.  Informed that Fercho Lorenz PA-C does treat patients for Erectile dysfunction and if during visit he feels that you need further evaluation he could refer you to urology.    Patient made an appointment  1/7 4:40.    Yajaira Mensah RN

## 2020-01-07 ENCOUNTER — OFFICE VISIT (OUTPATIENT)
Dept: FAMILY MEDICINE | Facility: CLINIC | Age: 40
End: 2020-01-07
Payer: COMMERCIAL

## 2020-01-07 VITALS
HEIGHT: 76 IN | SYSTOLIC BLOOD PRESSURE: 122 MMHG | BODY MASS INDEX: 23.87 KG/M2 | DIASTOLIC BLOOD PRESSURE: 70 MMHG | TEMPERATURE: 98 F | WEIGHT: 196 LBS | HEART RATE: 64 BPM

## 2020-01-07 DIAGNOSIS — N52.9 ERECTILE DYSFUNCTION, UNSPECIFIED ERECTILE DYSFUNCTION TYPE: Primary | ICD-10-CM

## 2020-01-07 PROCEDURE — 99213 OFFICE O/P EST LOW 20 MIN: CPT | Performed by: PHYSICIAN ASSISTANT

## 2020-01-07 RX ORDER — SILDENAFIL 100 MG/1
50-100 TABLET, FILM COATED ORAL DAILY PRN
Qty: 30 TABLET | Refills: 2 | Status: SHIPPED | OUTPATIENT
Start: 2020-01-07 | End: 2021-02-26

## 2020-01-07 ASSESSMENT — MIFFLIN-ST. JEOR: SCORE: 1891.58

## 2020-01-07 NOTE — PROGRESS NOTES
"Subjective     Obed Higginbotham is a 40 year old male who presents to clinic today for the following health issues:    HPI   Patient is here to discuss erectile dysfunction. Notes his sex drive and interest are good. Gets an erection but very quickly loses it. Relationship is good.    No vascular history or concern for vascular causes. He does not smoke, use alcohol or recreational drugs. He exercises quite regularly and has good stamina.     Patient Active Problem List   Diagnosis     Stress fracture of left tibia      Current Outpatient Medications   Medication     sildenafil (VIAGRA) 100 MG tablet     No current facility-administered medications for this visit.         Patient Active Problem List   Diagnosis     Stress fracture of left tibia     Past Surgical History:   Procedure Laterality Date     LAPAROSCOPIC APPENDECTOMY N/A 12/18/2015    Procedure: LAPAROSCOPIC APPENDECTOMY;  Surgeon: Rodriguez Gill MD;  Location:  OR     NO HISTORY OF SURGERY         Social History     Tobacco Use     Smoking status: Current Some Day Smoker     Packs/day: 0.25     Smokeless tobacco: Never Used   Substance Use Topics     Alcohol use: No     History reviewed. No pertinent family history.        Reviewed and updated as needed this visit by Provider         Review of Systems   ROS COMP: Constitutional, HEENT, cardiovascular, pulmonary, gi and gu systems are negative, except as otherwise noted.      Objective    /70 (BP Location: Right arm, Patient Position: Chair, Cuff Size: Adult Regular)   Pulse 64   Temp 98  F (36.7  C) (Oral)   Ht 1.924 m (6' 3.75\")   Wt 88.9 kg (196 lb)   BMI 24.02 kg/m    Body mass index is 24.02 kg/m .  Physical Exam   GENERAL: healthy, alert and no distress    Diagnostic Test Results:  Labs reviewed in Epic        Assessment & Plan     (N52.9) Erectile dysfunction, unspecified erectile dysfunction type  (primary encounter diagnosis)  Comment:   Plan: sildenafil (VIAGRA) 100 MG " tablet        Unclear cause. May simply be more mental than physiologic. Explained the differences, options, exercise, - LArginine trial suggested, sleep, healthy diet, etc. Will trial sildenafil. This prescription is given with a discussion of side effects, risks and proper use.  Instructions are given to follow up if not improving or symptoms change or worsen as discussed.     BAKARI Nuñez, PA-C

## 2020-01-07 NOTE — PATIENT INSTRUCTIONS
1. L-Arginine - weight lifters use, but also helps erections - 5 grams powder daily (tastes awful) - this can help if taken daily - can take in addition to Viagra if you want

## 2020-03-20 ENCOUNTER — TELEPHONE (OUTPATIENT)
Dept: FAMILY MEDICINE | Facility: CLINIC | Age: 40
End: 2020-03-20

## 2020-03-20 NOTE — TELEPHONE ENCOUNTER
I called and spoke to Obed, he reports he, his wife, and daughter have had diarrhea, headache, body aches, chills for about a week.       Denies fever or cough.   States they are slowly improving and denies anyone with blood in stool or signs of dehydration such as decreased urination or lethargic/weak.    6 year old son just developed diarrhea today and he is limiting his diet and pushing fluids.    I advised we triage symptoms on all 4 members.   He declines, just wants to be tested to see if they have Covid 19 as he is sure they do.   I advised no testing done unless a person is severely ill and hospitalized at this time.    He expressed frustration at the government for this and states they will just manage at home.    I advised red flags for being seen in ER:  Not urinating at least once in 8-12 hours, fever, blood in stool, severe abdominal pain.    Call if diarrhea does not continue to resolve as diarrhea for a week or more is concerning.  He does not plan to be seen or have family come in at this point but will monitor symptoms.    Sarah Grady RN  [facility]

## 2020-03-20 NOTE — TELEPHONE ENCOUNTER
Reason for call:  Patient reporting a symptom    Symptom or request: Patient calling stating that he has diarrhea, bloating, no appetite, shivering, headache on one side of his head, muscle and joint aches. No fever. All 4 family members have same symptoms. He is concerned that this may be COVID-19.    Duration (how long have symptoms been present): 6 days    Have you been treated for this before? No    Additional comments: Patient uses [facility]    Phone Number patient can be reached at:  Home number on file [phone] (home)    Best Time:  any    Can we leave a detailed message on this number:  YES    Call taken on 3/20/2020 at 9:54 AM by Sharon Best

## 2020-05-25 NOTE — TELEPHONE ENCOUNTER
Reached out to patient per request of provider below to ensure he received results. Patient/family was instructed to return call to [facility] RN directly on the RN Call back line at [phone].     Kelly Chau, Maritza Villar, HOSSEIN PERRY  [facility] [place] 21 hours ago (10:05 AM)      Called pt on Sunday to relay information. Please follow up on Monday to assure he received it. Thanks     Routing comment         PAST SURGICAL HISTORY:  No significant past surgical history

## 2020-12-14 ENCOUNTER — HEALTH MAINTENANCE LETTER (OUTPATIENT)
Age: 40
End: 2020-12-14

## 2021-10-02 ENCOUNTER — HEALTH MAINTENANCE LETTER (OUTPATIENT)
Age: 41
End: 2021-10-02

## 2022-01-22 ENCOUNTER — HEALTH MAINTENANCE LETTER (OUTPATIENT)
Age: 42
End: 2022-01-22

## 2022-09-03 ENCOUNTER — HEALTH MAINTENANCE LETTER (OUTPATIENT)
Age: 42
End: 2022-09-03

## 2023-04-29 ENCOUNTER — HEALTH MAINTENANCE LETTER (OUTPATIENT)
Age: 43
End: 2023-04-29